# Patient Record
Sex: MALE | Race: WHITE | URBAN - METROPOLITAN AREA
[De-identification: names, ages, dates, MRNs, and addresses within clinical notes are randomized per-mention and may not be internally consistent; named-entity substitution may affect disease eponyms.]

---

## 2018-05-17 ENCOUNTER — HOSPITAL ENCOUNTER (OUTPATIENT)
Dept: MRI IMAGING | Facility: CLINIC | Age: 43
Discharge: HOME | End: 2018-05-17
Attending: PHYSICIAN ASSISTANT | Admitting: PHYSICIAN ASSISTANT
Payer: COMMERCIAL

## 2018-05-17 DIAGNOSIS — R20.0 NUMBNESS OF LEGS: ICD-10-CM

## 2018-05-17 DIAGNOSIS — M54.50 LOWER BACK PAIN: ICD-10-CM

## 2018-05-17 LAB
CREAT BLD-MCNC: 1 MG/DL (ref 0.66–1.25)
GFR SERPL CREATININE-BSD FRML MDRD: 81 ML/MIN/1.7M2

## 2018-05-17 PROCEDURE — A9585 GADOBUTROL INJECTION: HCPCS | Performed by: RADIOLOGY

## 2018-05-17 PROCEDURE — 82565 ASSAY OF CREATININE: CPT

## 2018-05-17 PROCEDURE — 25000128 H RX IP 250 OP 636: Performed by: RADIOLOGY

## 2018-05-17 PROCEDURE — 72158 MRI LUMBAR SPINE W/O & W/DYE: CPT

## 2018-05-17 RX ORDER — GADOBUTROL 604.72 MG/ML
7.5 INJECTION INTRAVENOUS ONCE
Status: COMPLETED | OUTPATIENT
Start: 2018-05-17 | End: 2018-05-17

## 2018-05-17 RX ADMIN — GADOBUTROL 7.5 ML: 604.72 INJECTION INTRAVENOUS at 19:18

## 2018-05-21 ENCOUNTER — OFFICE VISIT (OUTPATIENT)
Dept: ORTHOPEDICS | Facility: CLINIC | Age: 43
End: 2018-05-21
Payer: COMMERCIAL

## 2018-05-21 VITALS — WEIGHT: 177 LBS | SYSTOLIC BLOOD PRESSURE: 160 MMHG | BODY MASS INDEX: 28.57 KG/M2 | DIASTOLIC BLOOD PRESSURE: 102 MMHG

## 2018-05-21 DIAGNOSIS — M54.41 CHRONIC RIGHT-SIDED LOW BACK PAIN WITH RIGHT-SIDED SCIATICA: ICD-10-CM

## 2018-05-21 DIAGNOSIS — G89.29 CHRONIC RIGHT-SIDED LOW BACK PAIN WITH RIGHT-SIDED SCIATICA: ICD-10-CM

## 2018-05-21 DIAGNOSIS — M21.371 RIGHT FOOT DROP: ICD-10-CM

## 2018-05-21 DIAGNOSIS — M53.9 MULTILEVEL DEGENERATIVE DISC DISEASE: Primary | ICD-10-CM

## 2018-05-21 PROCEDURE — 99203 OFFICE O/P NEW LOW 30 MIN: CPT | Performed by: PHYSICAL MEDICINE & REHABILITATION

## 2018-05-21 RX ORDER — METHYLPREDNISOLONE 4 MG
8 TABLET, DOSE PACK ORAL SEE ADMIN INSTRUCTIONS
COMMUNITY

## 2018-05-21 NOTE — PATIENT INSTRUCTIONS
Today's Plan of Care:  -Referral to a Spine Surgeon  -Epidural steroid injection ordered  -Ice or heat 15-20 minutes as needed (Avoid sleeping on a heating pad or ice)  -Patient's preferred over the counter medications as directed on packaging as needed for pain or soreness.  Please take ibuprofen with food. Do not premedicate prior to activity.  -Patient to follow up with Primary Care provider regarding elevated blood pressure.    Follow Up:  As needed. Please call with any questions or concerns.

## 2018-05-21 NOTE — LETTER
May 21, 2018      Clif Martinez  [id number] [address] [place] [address]        To Whom It May Concern:    Clif Martinez  was seen on 5/21/2018 for back pain.  He may return to work without restrictions. He may work as tolerated. Thank you for your help in advance.         Sincerely,        Denisha Momin MD, CAQ

## 2018-05-21 NOTE — MR AVS SNAPSHOT
After Visit Summary   5/21/2018    Clif Martinez    MRN: [id number]           Patient Information     Date Of Birth          1975        Visit Information        Provider Department      5/21/2018 12:00 PM Denisha Momin MD [facility]        Today's Diagnoses     Multilevel degenerative disc disease    -  1    Chronic right-sided low back pain with right-sided sciatica        Right foot drop          Care Instructions    Today's Plan of Care:  -Referral to a Spine Surgeon  -Epidural steroid injection ordered  -Ice or heat 15-20 minutes as needed (Avoid sleeping on a heating pad or ice)  -Patient's preferred over the counter medications as directed on packaging as needed for pain or soreness.  Please take ibuprofen with food. Do not premedicate prior to activity.  -Patient to follow up with Primary Care provider regarding elevated blood pressure.    Follow Up:  As needed. Please call with any questions or concerns.                   Follow-ups after your visit        Additional Services     ORTHO  REFERRAL       [facility] is referring you to the Orthopedic  Services at [facility].       The  Representative will assist you in the coordination of your Orthopedic and Musculoskeletal Care as prescribed by your physician.    The  Representative will call you within 24 hours to help schedule your appointment, or you may contact the  Representative at:    [facility] ~ [phone]  [facility] ~ [phone]  [facility] ~ [phone]    Type of Referral : Spine: Lumbar  **Choose Medical Spine Specialist (unless patient was seen by a Medical Spine Specialist within the past 6 months).**  Surgical Evaluation is advised if the patient presents with one or more of the following red flags: Evidence of Spinal Tumor, Infection or Fracture, Cauda Equina  "Syndrome, Sudden or Progressive Weakness, Loss of Bowel or Bladder Control, or any other documented emergent neurological condition resulting from a Lumbar Spinal Condition. Spine Surgeon        Timeframe requested: 1 - 2 days     Coverage of these services is subject to the terms and limitations of your health insurance plan.  Please call member services at your health plan with any benefit or coverage questions.      If X-rays, CT or MRI's have been performed, please contact the facility where they were done to arrange for , prior to your scheduled appointment.  Please bring this referral request to your appointment and present it to your specialist.                  Future tests that were ordered for you today     Open Future Orders        Priority Expected Expires Ordered    XR Lumbar Transforaminal Inj Single Routine 5/21/2018 5/21/2019 5/21/2018            Who to contact     If you have questions or need follow up information about today's clinic visit or your schedule please contact [facility] directly at [phone].  Normal or non-critical lab and imaging results will be communicated to you by MyChart, letter or phone within 4 business days after the clinic has received the results. If you do not hear from us within 7 days, please contact the clinic through [facility] or phone. If you have a critical or abnormal lab result, we will notify you by phone as soon as possible.  Submit refill requests through [employer] or call your pharmacy and they will forward the refill request to us. Please allow 3 business days for your refill to be completed.          Additional Information About Your Visit        [employer] Information     [employer] lets you send messages to your doctor, view your test results, renew your prescriptions, schedule appointments and more. To sign up, go to [email or web address] . Click on \"Log in\" on the left side of the screen, which will take you to the Welcome page. Then " "click on \"Sign up Now\" on the right side of the page.     You will be asked to enter the access code listed below, as well as some personal information. Please follow the directions to create your username and password.     Your access code is: [email or web address]  Expires: 2018 12:35 PM     Your access code will  in 90 days. If you need help or a new code, please call your [place] clinic or [phone].        Care EveryWhere ID     This is your Care EveryWhere ID. This could be used by other organizations to access your [place] medical records  [id number]        Your Vitals Were     BMI (Body Mass Index)                   28.57 kg/m2            Blood Pressure from Last 3 Encounters:   18 (!) 160/102   16 (!) 132/97   16 109/78    Weight from Last 3 Encounters:   18 177 lb (80.3 kg)   16 171 lb (77.6 kg)   16 165 lb (74.8 kg)              We Performed the Following     ORTHO  REFERRAL        Primary Care Provider Office Phone # Fax #    Peter MOREJON [facility] [phone]       [address] [address]        Equal Access to Services     SHANAE DAY : Hadii alexsander ku hadasho Soomaali, waaxda luqadaha, qaybta kaalmada adeegyada, eric montiel haysuellen conn . So [facility] [phone].    ATENCIÓN: Si habla español, tiene a verdugo disposición servicios gratuitos de asistencia lingüística. Llame al [phone].    We comply with applicable federal civil rights laws and [place] laws. We do not discriminate on the basis of race, color, national origin, age, disability, sex, sexual orientation, or gender identity.            Thank you!     Thank you for choosing [facility]  for your care. Our goal is always to provide you with excellent care. Hearing back from our patients is one way we can continue to improve our services. Please take a few minutes to complete the written survey that you may receive in the " mail after your visit with us. Thank you!             Your Updated Medication List - Protect others around you: Learn how to safely use, store and throw away your medicines at www.disposemymeds.org.          This list is accurate as of 5/21/18 12:37 PM.  Always use your most recent med list.                   Brand Name Dispense Instructions for use Diagnosis    AMARYL PO      Take 1 mg by mouth daily (with breakfast)        GABAPENTIN PO      Take 300 mg by mouth 3 times daily        IBUPROFEN PO      Take 800 mg by mouth every 4 hours as needed for moderate pain        levothyroxine 25 mcg/mL Susp    SYNTHROID     Take 75 mg by mouth once        LIPITOR PO      Take 20 mg by mouth daily        METFORMIN HCL PO      Take 1,000 mg by mouth 2 times daily (with meals)        methylPREDNISolone 4 MG tablet    MEDROL DOSEPAK     Take 8 mg by mouth See Admin Instructions follow package directions        OMEPRAZOLE PO      Take 40 mg by mouth daily

## 2018-05-21 NOTE — PROGRESS NOTES
Sports Medicine Clinic Visit    PCP: Peter Kramer    CC: Patient presents with:  Back Pain      HPI:  Clif Martinez is a 43 year old male who is seen as a self referral.  He has chronic back pain, but notes his recent episode began two weeks ago.  The back pain is low on the right side.  He has history of 4 back surgeries, last one being in June of 2015. He was on a trip to [place] and when he returned 2 weeks ago he began to have numbness in the leg and a foot drop.   He rates the pain at a 10/10 at its worst and a 10/10 currently.  Symptoms are relieved with sitting. Symptoms are worsened by walking, lying down, and movement. He endorses numbness, tingling and weakness.   He denies swelling, bruising, popping, grinding, catching, locking, instability, pain in other joints and fever, chills.  Other treatment has included gapabentin, Medrol Dose pack, heat, ice, stretching. He has had previous surgeries, ESIs (without relief before previous surgeries) and physical therapy.  His primary referred to neurosurgery but he needs to see a spine specialist for his insurance first.        Review of Systems:  Musculoskeletal: as above  Remainder of review of systems is negative including constitutional, eyes, ENT, CV, pulmonary, GI, , endocrine, skin, hematologic, and neurologic except as noted in HPI or medical history.    History reviewed. No pertinent past surgical/medical/family/social history other than as mentioned in HPI.    Patient Active Problem List   Diagnosis   (none) - all problems resolved or deleted     Past Surgical History:   Procedure Laterality Date     BACK SURGERY       WRIST SURGERY       History reviewed. No pertinent family history.  Social History     Social History     Marital status:      Spouse name: N/A     Number of children: N/A     Years of education: N/A     Occupational History     Not on file.     Social History Main Topics     Smoking status: Former Smoker     Smokeless  tobacco: Current User     Alcohol use Yes      Comment: case of beer on the weekends     Drug use: No     Sexual activity: Yes     Other Topics Concern     Not on file     Social History Narrative       He works as a .    Current Outpatient Prescriptions   Medication     Atorvastatin Calcium (LIPITOR PO)     GABAPENTIN PO     Glimepiride (AMARYL PO)     levothyroxine (SYNTHROID) 25 mcg/mL     METFORMIN HCL PO     methylPREDNISolone (MEDROL DOSEPAK) 4 MG tablet     OMEPRAZOLE PO     IBUPROFEN PO     No current facility-administered medications for this visit.      Allergies   Allergen Reactions     Celebrex [Celecoxib] Rash         Objective:  BP (!) 160/102  Wt 177 lb (80.3 kg)  BMI 28.57 kg/m2    General: Alert and in no distress    Head: Normocephalic, atraumatic  Eyes: no scleral icterus or conjunctival erythema   Oropharynx:  Mucous membranes moist  Skin: no erythema, petechiae, or jaundice  CV: regular rhythm by palpation, 2+ distal pulses  Resp: normal respiratory effort without conversational dyspnea   Psych: normal mood and affect    Gait: Non-antalgic, appropriate coordination and balance   Neuro: Motor strength and sensation as noted below    Musculoskeletal:    Low back exam:    Inspection:       Healed lumbar surgical scars    Palpation:  No tenderness to palpation of the lumbosacral spine and paraspinal muscles    ROM: Full lumbar flexion, extension, rotation, and lateral flexion.  Forward flexion, extension, and right lateral flexion are painful.    Strength:  Left:  5/5 hip flexion/abduction/adduction, 5/5 knee flexion/extension, 5/5 ankle dorsiflexion/plantarflexion, 5/5 great toe extension, 5/5 toe flexion  Right: 5/5 hip flexion/abduction/adduction, 5/5 knee flexion/extension, 1/5 ankle dorsiflexion, 5-/5 ankle plantarflexion, 1/5 great toe extension, 5/5 toe flexion    Sensation:  Altered sensation to light touch distal to the right knee    Special tests: Right straight leg raise causes  right low back pain but not leg pain    Radiology:  Independent visualization of images performed and reviewed with Clif and his wife.    Recent Results (from the past 744 hour(s))   MR Lumbar Spine w/o & w Contrast    Narrative    MR LUMBAR SPINE WITHOUT AND WITH CONTRAST 5/17/2018 7:34 PM    HISTORY:  Right leg numbness. Lower back pain. Numbness of legs.    TECHNIQUE: Sagittal and transverse T1 and T2, sagittal inversion  recovery, and post gadolinium enhanced sagittal and transverse  T1-weighted pulse sequences.    DOSE:  7.5 mL Gadavist.    FINDINGS: Five lumbar vertebrae are assumed. There is multilevel  degenerative disc disease with complete loss of disc signal at T12-L1,  L3-4, and L4-5. There is also moderate-to-severe loss of disc height  at T12-L1 and L3-4. Mild apophyseal joint degenerative arthrosis is  noted, greatest at L5-S1 without periarticular marrow edema or  degenerative spondylolisthesis. Vertebral body heights are normal. The  conus medullaris is unremarkable in appearance on the sagittal images.       T12-L1: Small central disc protrusion slightly to the left of midline.  No central stenosis or effacement of the tip of the conus medullaris.  The neural foramina are bilaterally patent.    L1-2, L2-3: Normal.    L3-4: There has been partial laminectomy. However, there is diffuse  annular bulge and a right lateral recess with a large caudally  dissecting disc extrusion or herniation. This measures approximately  0.9 cm in AP diameter and extends caudally 1.9 cm from the disc level  beneath the right L4 pedicle. Rim enhancement is noted about the disc  fragment. At the disc level, there is overall moderate central  stenosis. Moderate-to-severe bilateral foraminal stenosis is also  noted.    L4-5: Posterior decompression without central stenosis. Within the  right lateral recess, there is a low-signal intensity 0.5 cm disc  fragment. Moderate bilateral foraminal stenosis.    L5-S1: Normal.       Impression    IMPRESSION:  1. Moderate-to-severe degenerative disc disease at T12-L1, L3-4, and  to lesser degree at L4-5.  2. L3-4 large 0.9 x 1.9 cm right lateral recess caudally dissecting  disc extrusion likely compressing the descending right L4 nerve root.  L3-4 annular bulge and osteophytic spurring results in moderate  central stenosis at the cephalad margin of the disc level.  3. L4-5 low-signal 0.5 cm right lateral recess disc fragment which may  compress the descending right L5 nerve root within the right lateral  recess. There has been a previous posterior decompression with no  evidence of L4-5 central stenosis.  4. Bilateral L3 and L4 foraminal stenosis.    AARON ROTHMAN MD         Assessment:  1. Multilevel degenerative disc disease    2. Chronic right-sided low back pain with right-sided sciatica    3. Right foot drop        Plan:  Discussed the assessment with the patient and developed a plan together:  -Reviewed clinical presentation and MRI results/images with Clif and his wife.  His weakness with ankle dorsiflexion and great toe extension are consistent with the right L4 and L5 nerve roots being affected.  I have placed a referral to spine surgery as well as one for an epidural steroid injection.    -Ice or heat 15-20 minutes as needed (Avoid sleeping on a heating pad or ice)  -Patient's preferred over the counter medications as directed on packaging as needed for pain or soreness.  Please take ibuprofen with food. Do not premedicate prior to activity.  -Work as tolerated.  Letter provided.    -Patient to follow up with Primary Care provider regarding elevated blood pressure.    -Follow up with me as needed.  Please call with questions or concerns.      Monika Momin MD, CAQ  [facility]

## 2018-05-21 NOTE — LETTER
5/21/2018         RE: Clif Martinez  [address] [address] [place] [address]        Dear Colleague,    Thank you for referring your patient, Clif Martinez, to the [facility]. Please see a copy of my visit note below.    Sports Medicine Clinic Visit    PCP: Peter Kramer    CC: Patient presents with:  Back Pain      HPI:  Clif Martinez is a 43 year old male who is seen as a self referral.  He has chronic back pain, but notes his recent episode back two weeks ago.  The back pain is low on the right side.  He has history of 4 back surgeries, last one being in June of 2015. He was on a trip to [place] and when he returned 2 weeks ago he began to have numbness in the leg and a foot drop.   He rates the pain at a 10/10 at its worst and a 10/10 currently.  Symptoms are relieved with sitting. Symptoms are worsened by walking, lying down, and movement. He endorses numbness, tingling and weakness.   He denies swelling, bruising, popping, grinding, catching, locking, instability, pain in other joints and fever, chills.  Other treatment has included gapabentin, Medrol Dose pack, heat, ice, stretching. He has had previous surgeries, ESIs (without relief before previous surgeries) and physical therapy.  His primary referred to neurosurgery but he needs to see a spine specialist for his insurance first.        Review of Systems:  Musculoskeletal: as above  Remainder of review of systems is negative including constitutional, eyes, ENT, CV, pulmonary, GI, , endocrine, skin, hematologic, and neurologic except as noted in HPI or medical history.    History reviewed. No pertinent past surgical/medical/family/social history other than as mentioned in HPI.    Patient Active Problem List   Diagnosis   (none) - all problems resolved or deleted     Past Surgical History:   Procedure Laterality Date     BACK SURGERY       WRIST SURGERY       History reviewed. No pertinent family history.  Social History     Social  History     Marital status:      Spouse name: N/A     Number of children: N/A     Years of education: N/A     Occupational History     Not on file.     Social History Main Topics     Smoking status: Former Smoker     Smokeless tobacco: Current User     Alcohol use Yes      Comment: case of beer on the weekends     Drug use: No     Sexual activity: Yes     Other Topics Concern     Not on file     Social History Narrative       He works as a .    Current Outpatient Prescriptions   Medication     Atorvastatin Calcium (LIPITOR PO)     GABAPENTIN PO     Glimepiride (AMARYL PO)     levothyroxine (SYNTHROID) 25 mcg/mL     METFORMIN HCL PO     methylPREDNISolone (MEDROL DOSEPAK) 4 MG tablet     OMEPRAZOLE PO     IBUPROFEN PO     No current facility-administered medications for this visit.      Allergies   Allergen Reactions     Celebrex [Celecoxib] Rash         Objective:  BP (!) 160/102  Wt 177 lb (80.3 kg)  BMI 28.57 kg/m2    General: Alert and in no distress    Head: Normocephalic, atraumatic  Eyes: no scleral icterus or conjunctival erythema   Oropharynx:  Mucous membranes moist  Skin: no erythema, petechiae, or jaundice  CV: regular rhythm by palpation, 2+ distal pulses  Resp: normal respiratory effort without conversational dyspnea   Psych: normal mood and affect    Gait: Non-antalgic, appropriate coordination and balance   Neuro: Motor strength and sensation as noted below    Musculoskeletal:    Low back exam:    Inspection:       Healed lumbar surgical scars    Palpation:  No tenderness to palpation of the lumbosacral spine and paraspinal muscles    ROM: Full lumbar flexion, extension, rotation, and lateral flexion.  Forward flexion, extension, and right lateral flexion are painful.    Strength:  Left:  5/5 hip flexion/abduction/adduction, 5/5 knee flexion/extension, 5/5 ankle dorsiflexion/plantarflexion, 5/5 great toe extension, 5/5 toe flexion  Right: 5/5 hip flexion/abduction/adduction, 5/5  knee flexion/extension, 1/5 ankle dorsiflexion, 5-/5 ankle plantarflexion, 1/5 great toe extension, 5/5 toe flexion    Sensation:  Altered sensation to light touch distal to the right knee    Special tests: Right straight leg raise causes right low back pain but not leg pain    Radiology:  Independent visualization of images performed and reviewed with Clif and his wife.    Recent Results (from the past 744 hour(s))   MR Lumbar Spine w/o & w Contrast    Narrative    MR LUMBAR SPINE WITHOUT AND WITH CONTRAST 5/17/2018 7:34 PM    HISTORY:  Right leg numbness. Lower back pain. Numbness of legs.    TECHNIQUE: Sagittal and transverse T1 and T2, sagittal inversion  recovery, and post gadolinium enhanced sagittal and transverse  T1-weighted pulse sequences.    DOSE:  7.5 mL Gadavist.    FINDINGS: Five lumbar vertebrae are assumed. There is multilevel  degenerative disc disease with complete loss of disc signal at T12-L1,  L3-4, and L4-5. There is also moderate-to-severe loss of disc height  at T12-L1 and L3-4. Mild apophyseal joint degenerative arthrosis is  noted, greatest at L5-S1 without periarticular marrow edema or  degenerative spondylolisthesis. Vertebral body heights are normal. The  conus medullaris is unremarkable in appearance on the sagittal images.       T12-L1: Small central disc protrusion slightly to the left of midline.  No central stenosis or effacement of the tip of the conus medullaris.  The neural foramina are bilaterally patent.    L1-2, L2-3: Normal.    L3-4: There has been partial laminectomy. However, there is diffuse  annular bulge and a right lateral recess with a large caudally  dissecting disc extrusion or herniation. This measures approximately  0.9 cm in AP diameter and extends caudally 1.9 cm from the disc level  beneath the right L4 pedicle. Rim enhancement is noted about the disc  fragment. At the disc level, there is overall moderate central  stenosis. Moderate-to-severe bilateral  foraminal stenosis is also  noted.    L4-5: Posterior decompression without central stenosis. Within the  right lateral recess, there is a low-signal intensity 0.5 cm disc  fragment. Moderate bilateral foraminal stenosis.    L5-S1: Normal.      Impression    IMPRESSION:  1. Moderate-to-severe degenerative disc disease at T12-L1, L3-4, and  to lesser degree at L4-5.  2. L3-4 large 0.9 x 1.9 cm right lateral recess caudally dissecting  disc extrusion likely compressing the descending right L4 nerve root.  L3-4 annular bulge and osteophytic spurring results in moderate  central stenosis at the cephalad margin of the disc level.  3. L4-5 low-signal 0.5 cm right lateral recess disc fragment which may  compress the descending right L5 nerve root within the right lateral  recess. There has been a previous posterior decompression with no  evidence of L4-5 central stenosis.  4. Bilateral L3 and L4 foraminal stenosis.    AARON ROTHMAN MD         Assessment:  1. Multilevel degenerative disc disease    2. Chronic right-sided low back pain with right-sided sciatica    3. Right foot drop        Plan:  Discussed the assessment with the patient and developed a plan together:  -Reviewed clinical presentation and MRI results/images with Clif and his wife.  His weakness with ankle dorsiflexion and great toe extension are consistent with the right L4 and L5 nerve roots being affected.  I have placed a referral to spine surgery as well as one for an epidural steroid injection.    -Ice or heat 15-20 minutes as needed (Avoid sleeping on a heating pad or ice)  -Patient's preferred over the counter medications as directed on packaging as needed for pain or soreness.  Please take ibuprofen with food. Do not premedicate prior to activity.  -Work as tolerated.  Letter provided.    -Patient to follow up with Primary Care provider regarding elevated blood pressure.    Monika Momin MD, CAQ  [facility]      Again,  thank you for allowing me to participate in the care of your patient.        Sincerely,        Denisha Momin MD

## 2018-06-22 ENCOUNTER — HOSPITAL ENCOUNTER (OUTPATIENT)
Dept: CT IMAGING | Facility: CLINIC | Age: 43
Discharge: HOME | End: 2018-06-22
Attending: PHYSICIAN ASSISTANT | Admitting: PHYSICIAN ASSISTANT
Payer: COMMERCIAL

## 2018-06-22 DIAGNOSIS — R10.31 RLQ ABDOMINAL PAIN: ICD-10-CM

## 2018-06-22 DIAGNOSIS — Z98.890 STATUS POST SURGERY: ICD-10-CM

## 2018-06-22 LAB
CREAT BLD-MCNC: 0.8 MG/DL (ref 0.66–1.25)
GFR SERPL CREATININE-BSD FRML MDRD: >90 ML/MIN/1.7M2

## 2018-06-22 PROCEDURE — 25000128 H RX IP 250 OP 636: Performed by: RADIOLOGY

## 2018-06-22 PROCEDURE — 82565 ASSAY OF CREATININE: CPT

## 2018-06-22 PROCEDURE — 74177 CT ABD & PELVIS W/CONTRAST: CPT

## 2018-06-22 PROCEDURE — 25000125 ZZHC RX 250: Performed by: RADIOLOGY

## 2018-06-22 RX ORDER — IOPAMIDOL 755 MG/ML
500 INJECTION, SOLUTION INTRAVASCULAR ONCE
Status: COMPLETED | OUTPATIENT
Start: 2018-06-22 | End: 2018-06-22

## 2018-06-22 RX ADMIN — IOPAMIDOL 85 ML: 755 INJECTION, SOLUTION INTRAVENOUS at 08:14

## 2018-06-22 RX ADMIN — SODIUM CHLORIDE 60 ML: 9 INJECTION, SOLUTION INTRAVENOUS at 08:14

## 2018-07-01 ENCOUNTER — HOME INFUSION (PRE-WILLOW HOME INFUSION) (OUTPATIENT)
Dept: PHARMACY | Facility: CLINIC | Age: 43
End: 2018-07-01

## 2018-07-02 ENCOUNTER — HOME INFUSION (PRE-WILLOW HOME INFUSION) (OUTPATIENT)
Dept: PHARMACY | Facility: CLINIC | Age: 43
End: 2018-07-02

## 2018-07-02 NOTE — PROGRESS NOTES
This is a recent snapshot of the patient's [facility] medical record.  For current drug dose and complete information and questions, call [phone] or In [facility] pool, fv home infusion ([id number])  CSN Number:  [id number]

## 2018-07-03 ENCOUNTER — HOME INFUSION (PRE-WILLOW HOME INFUSION) (OUTPATIENT)
Dept: PHARMACY | Facility: CLINIC | Age: 43
End: 2018-07-03

## 2018-07-03 DIAGNOSIS — K65.1 POSTOPERATIVE INTRA-ABDOMINAL ABSCESS (H): Primary | ICD-10-CM

## 2018-07-03 DIAGNOSIS — T81.43XA POSTOPERATIVE INTRA-ABDOMINAL ABSCESS (H): Primary | ICD-10-CM

## 2018-07-03 LAB
BUN SERPL-MCNC: 12 MG/DL (ref 7–30)
CREAT SERPL-MCNC: 0.63 MG/DL (ref 0.66–1.25)
CRP SERPL-MCNC: 27.9 MG/L (ref 0–8)
ERYTHROCYTE [DISTWIDTH] IN BLOOD BY AUTOMATED COUNT: 13.9 % (ref 10–15)
GFR SERPL CREATININE-BSD FRML MDRD: >90 ML/MIN/1.7M2
HCT VFR BLD AUTO: 37.3 % (ref 40–53)
HGB BLD-MCNC: 12.1 G/DL (ref 13.3–17.7)
MCH RBC QN AUTO: 27.9 PG (ref 26.5–33)
MCHC RBC AUTO-ENTMCNC: 32.4 G/DL (ref 31.5–36.5)
MCV RBC AUTO: 86 FL (ref 78–100)
PLATELET # BLD AUTO: 496 10E9/L (ref 150–450)
RBC # BLD AUTO: 4.33 10E12/L (ref 4.4–5.9)
VANCOMYCIN SERPL-MCNC: 17.1 MG/L
WBC # BLD AUTO: 9 10E9/L (ref 4–11)

## 2018-07-03 PROCEDURE — 80202 ASSAY OF VANCOMYCIN: CPT | Performed by: INTERNAL MEDICINE

## 2018-07-03 PROCEDURE — 85027 COMPLETE CBC AUTOMATED: CPT | Performed by: INTERNAL MEDICINE

## 2018-07-03 PROCEDURE — 82565 ASSAY OF CREATININE: CPT | Performed by: INTERNAL MEDICINE

## 2018-07-03 PROCEDURE — 84520 ASSAY OF UREA NITROGEN: CPT | Performed by: INTERNAL MEDICINE

## 2018-07-03 PROCEDURE — 36415 COLL VENOUS BLD VENIPUNCTURE: CPT | Performed by: INTERNAL MEDICINE

## 2018-07-03 PROCEDURE — 86140 C-REACTIVE PROTEIN: CPT | Performed by: INTERNAL MEDICINE

## 2018-07-03 NOTE — PROGRESS NOTES
This is a recent snapshot of the patient's [facility] medical record.  For current drug dose and complete information and questions, call [phone] or In [facility] pool, fv home infusion ([id number])  CSN Number:  [id number]

## 2018-07-11 DIAGNOSIS — T81.43XA POSTOPERATIVE INTRA-ABDOMINAL ABSCESS (H): ICD-10-CM

## 2018-07-11 DIAGNOSIS — K65.1 POSTOPERATIVE INTRA-ABDOMINAL ABSCESS (H): ICD-10-CM

## 2018-07-11 LAB
BUN SERPL-MCNC: 9 MG/DL (ref 7–30)
CREAT SERPL-MCNC: 0.68 MG/DL (ref 0.66–1.25)
CRP SERPL-MCNC: 10.6 MG/L (ref 0–8)
ERYTHROCYTE [DISTWIDTH] IN BLOOD BY AUTOMATED COUNT: 14.6 % (ref 10–15)
GFR SERPL CREATININE-BSD FRML MDRD: >90 ML/MIN/1.7M2
HCT VFR BLD AUTO: 40.8 % (ref 40–53)
HGB BLD-MCNC: 13.2 G/DL (ref 13.3–17.7)
MCH RBC QN AUTO: 28 PG (ref 26.5–33)
MCHC RBC AUTO-ENTMCNC: 32.4 G/DL (ref 31.5–36.5)
MCV RBC AUTO: 86 FL (ref 78–100)
PLATELET # BLD AUTO: 439 10E9/L (ref 150–450)
RBC # BLD AUTO: 4.72 10E12/L (ref 4.4–5.9)
VANCOMYCIN SERPL-MCNC: 18.8 MG/L
WBC # BLD AUTO: 10.2 10E9/L (ref 4–11)

## 2018-07-11 PROCEDURE — 82565 ASSAY OF CREATININE: CPT | Performed by: INTERNAL MEDICINE

## 2018-07-11 PROCEDURE — 84520 ASSAY OF UREA NITROGEN: CPT | Performed by: INTERNAL MEDICINE

## 2018-07-11 PROCEDURE — 86140 C-REACTIVE PROTEIN: CPT | Performed by: INTERNAL MEDICINE

## 2018-07-11 PROCEDURE — 36415 COLL VENOUS BLD VENIPUNCTURE: CPT | Performed by: INTERNAL MEDICINE

## 2018-07-11 PROCEDURE — 80202 ASSAY OF VANCOMYCIN: CPT | Performed by: INTERNAL MEDICINE

## 2018-07-11 PROCEDURE — 85027 COMPLETE CBC AUTOMATED: CPT | Performed by: INTERNAL MEDICINE

## 2018-07-12 ENCOUNTER — HOME INFUSION (PRE-WILLOW HOME INFUSION) (OUTPATIENT)
Dept: PHARMACY | Facility: CLINIC | Age: 43
End: 2018-07-12

## 2018-07-12 NOTE — PROGRESS NOTES
This is a recent snapshot of the patient's [facility] medical record.  For current drug dose and complete information and questions, call [phone] or In Basket pool, fv home infusion ([id number])  CSN Number:  [id number]

## 2018-07-13 NOTE — PROGRESS NOTES
This is a recent snapshot of the patient's [facility] medical record.  For current drug dose and complete information and questions, call [phone] or In Basket pool, fv home infusion ([id number])  CSN Number:  [id number]

## 2018-07-18 ENCOUNTER — HOME INFUSION (PRE-WILLOW HOME INFUSION) (OUTPATIENT)
Dept: PHARMACY | Facility: CLINIC | Age: 43
End: 2018-07-18

## 2018-07-18 LAB
BASOPHILS # BLD AUTO: 0 10E9/L (ref 0–0.2)
BASOPHILS NFR BLD AUTO: 0.3 %
BUN SERPL-MCNC: 6 MG/DL (ref 7–30)
CREAT SERPL-MCNC: 0.6 MG/DL (ref 0.66–1.25)
CRP SERPL-MCNC: 5.7 MG/L (ref 0–8)
DIFFERENTIAL METHOD BLD: ABNORMAL
EOSINOPHIL # BLD AUTO: 0.5 10E9/L (ref 0–0.7)
EOSINOPHIL NFR BLD AUTO: 7.3 %
ERYTHROCYTE [DISTWIDTH] IN BLOOD BY AUTOMATED COUNT: 14.7 % (ref 10–15)
ERYTHROCYTE [SEDIMENTATION RATE] IN BLOOD BY WESTERGREN METHOD: 9 MM/H (ref 0–15)
GFR SERPL CREATININE-BSD FRML MDRD: >90 ML/MIN/1.7M2
HCT VFR BLD AUTO: 41.8 % (ref 40–53)
HGB BLD-MCNC: 13.2 G/DL (ref 13.3–17.7)
IMM GRANULOCYTES # BLD: 0 10E9/L (ref 0–0.4)
IMM GRANULOCYTES NFR BLD: 0.4 %
LYMPHOCYTES # BLD AUTO: 1.5 10E9/L (ref 0.8–5.3)
LYMPHOCYTES NFR BLD AUTO: 20.1 %
MCH RBC QN AUTO: 27.3 PG (ref 26.5–33)
MCHC RBC AUTO-ENTMCNC: 31.6 G/DL (ref 31.5–36.5)
MCV RBC AUTO: 87 FL (ref 78–100)
MONOCYTES # BLD AUTO: 0.8 10E9/L (ref 0–1.3)
MONOCYTES NFR BLD AUTO: 10.2 %
NEUTROPHILS # BLD AUTO: 4.6 10E9/L (ref 1.6–8.3)
NEUTROPHILS NFR BLD AUTO: 61.7 %
NRBC # BLD AUTO: 0 10*3/UL
NRBC BLD AUTO-RTO: 0 /100
PLATELET # BLD AUTO: 376 10E9/L (ref 150–450)
RBC # BLD AUTO: 4.83 10E12/L (ref 4.4–5.9)
VANCOMYCIN SERPL-MCNC: 17.2 MG/L
WBC # BLD AUTO: 7.4 10E9/L (ref 4–11)

## 2018-07-18 PROCEDURE — 80202 ASSAY OF VANCOMYCIN: CPT | Performed by: INTERNAL MEDICINE

## 2018-07-18 PROCEDURE — 84520 ASSAY OF UREA NITROGEN: CPT | Performed by: INTERNAL MEDICINE

## 2018-07-18 PROCEDURE — 85652 RBC SED RATE AUTOMATED: CPT | Performed by: INTERNAL MEDICINE

## 2018-07-18 PROCEDURE — 86140 C-REACTIVE PROTEIN: CPT | Performed by: INTERNAL MEDICINE

## 2018-07-18 PROCEDURE — 85025 COMPLETE CBC W/AUTO DIFF WBC: CPT | Performed by: INTERNAL MEDICINE

## 2018-07-18 PROCEDURE — 82565 ASSAY OF CREATININE: CPT | Performed by: INTERNAL MEDICINE

## 2018-07-19 ENCOUNTER — HOME INFUSION (PRE-WILLOW HOME INFUSION) (OUTPATIENT)
Dept: PHARMACY | Facility: CLINIC | Age: 43
End: 2018-07-19

## 2018-07-19 NOTE — PROGRESS NOTES
This is a recent snapshot of the patient's [facility] medical record.  For current drug dose and complete information and questions, call [phone] or In Basket pool, fv home infusion ([id number])  CSN Number:  [id number]

## 2018-07-20 ENCOUNTER — HOME INFUSION (PRE-WILLOW HOME INFUSION) (OUTPATIENT)
Dept: PHARMACY | Facility: CLINIC | Age: 43
End: 2018-07-20

## 2018-07-20 NOTE — PROGRESS NOTES
This is a recent snapshot of the patient's [facility] medical record.  For current drug dose and complete information and questions, call [phone] or In Basket pool, fv home infusion ([id number])  CSN Number:  [id number]

## 2018-07-23 NOTE — PROGRESS NOTES
This is a recent snapshot of the patient's [facility] medical record.  For current drug dose and complete information and questions, call [phone] or In Basket pool, fv home infusion ([id number])  CSN Number:  [id number]

## 2018-07-26 PROCEDURE — 84450 TRANSFERASE (AST) (SGOT): CPT | Performed by: INTERNAL MEDICINE

## 2018-07-26 PROCEDURE — 80202 ASSAY OF VANCOMYCIN: CPT | Performed by: INTERNAL MEDICINE

## 2018-07-26 PROCEDURE — 86140 C-REACTIVE PROTEIN: CPT | Performed by: INTERNAL MEDICINE

## 2018-07-26 PROCEDURE — 85025 COMPLETE CBC W/AUTO DIFF WBC: CPT | Performed by: INTERNAL MEDICINE

## 2018-07-26 PROCEDURE — 82565 ASSAY OF CREATININE: CPT | Performed by: INTERNAL MEDICINE

## 2018-07-26 PROCEDURE — 85652 RBC SED RATE AUTOMATED: CPT | Performed by: INTERNAL MEDICINE

## 2018-07-26 PROCEDURE — 84460 ALANINE AMINO (ALT) (SGPT): CPT | Performed by: INTERNAL MEDICINE

## 2018-07-27 ENCOUNTER — HOME INFUSION (PRE-WILLOW HOME INFUSION) (OUTPATIENT)
Dept: PHARMACY | Facility: CLINIC | Age: 43
End: 2018-07-27

## 2018-07-27 LAB
ALT SERPL W P-5'-P-CCNC: 22 U/L (ref 0–70)
AST SERPL W P-5'-P-CCNC: 13 U/L (ref 0–45)
BASOPHILS # BLD AUTO: 0 10E9/L (ref 0–0.2)
BASOPHILS NFR BLD AUTO: 0.2 %
CREAT SERPL-MCNC: 0.65 MG/DL (ref 0.66–1.25)
CRP SERPL-MCNC: 10.4 MG/L (ref 0–8)
DIFFERENTIAL METHOD BLD: NORMAL
EOSINOPHIL # BLD AUTO: 0.4 10E9/L (ref 0–0.7)
EOSINOPHIL NFR BLD AUTO: 8.2 %
ERYTHROCYTE [DISTWIDTH] IN BLOOD BY AUTOMATED COUNT: 14.7 % (ref 10–15)
ERYTHROCYTE [SEDIMENTATION RATE] IN BLOOD BY WESTERGREN METHOD: 8 MM/H (ref 0–15)
GFR SERPL CREATININE-BSD FRML MDRD: >90 ML/MIN/1.7M2
HCT VFR BLD AUTO: 42.6 % (ref 40–53)
HGB BLD-MCNC: 13.6 G/DL (ref 13.3–17.7)
IMM GRANULOCYTES # BLD: 0 10E9/L (ref 0–0.4)
IMM GRANULOCYTES NFR BLD: 0.2 %
LYMPHOCYTES # BLD AUTO: 1.5 10E9/L (ref 0.8–5.3)
LYMPHOCYTES NFR BLD AUTO: 34.3 %
MCH RBC QN AUTO: 27.5 PG (ref 26.5–33)
MCHC RBC AUTO-ENTMCNC: 31.9 G/DL (ref 31.5–36.5)
MCV RBC AUTO: 86 FL (ref 78–100)
MONOCYTES # BLD AUTO: 0.9 10E9/L (ref 0–1.3)
MONOCYTES NFR BLD AUTO: 19.3 %
NEUTROPHILS # BLD AUTO: 1.7 10E9/L (ref 1.6–8.3)
NEUTROPHILS NFR BLD AUTO: 37.8 %
NRBC # BLD AUTO: 0 10*3/UL
NRBC BLD AUTO-RTO: 0 /100
PLATELET # BLD AUTO: 294 10E9/L (ref 150–450)
RBC # BLD AUTO: 4.95 10E12/L (ref 4.4–5.9)
VANCOMYCIN SERPL-MCNC: 21.3 MG/L
WBC # BLD AUTO: 4.4 10E9/L (ref 4–11)

## 2018-07-30 NOTE — PROGRESS NOTES
This is a recent snapshot of the patient's [facility] medical record.  For current drug dose and complete information and questions, call [phone] or In Basket pool, fv home infusion ([id number])  CSN Number:  [id number]

## 2018-08-02 ENCOUNTER — HOME INFUSION (PRE-WILLOW HOME INFUSION) (OUTPATIENT)
Dept: PHARMACY | Facility: CLINIC | Age: 43
End: 2018-08-02

## 2018-08-06 NOTE — PROGRESS NOTES
This is a recent snapshot of the patient's [facility] medical record.  For current drug dose and complete information and questions, call [phone] or In Basket pool, fv home infusion ([id number])  CSN Number:  [id number]

## 2018-08-08 LAB
ALBUMIN SERPL-MCNC: 4.1 G/DL (ref 3.4–5)
ALP SERPL-CCNC: 91 U/L (ref 40–150)
ALT SERPL W P-5'-P-CCNC: 21 U/L (ref 0–70)
ANION GAP SERPL CALCULATED.3IONS-SCNC: 14 MMOL/L (ref 3–14)
AST SERPL W P-5'-P-CCNC: 16 U/L (ref 0–45)
BASOPHILS # BLD AUTO: 0.1 10E9/L (ref 0–0.2)
BASOPHILS NFR BLD AUTO: 0.7 %
BILIRUB SERPL-MCNC: 0.5 MG/DL (ref 0.2–1.3)
BUN SERPL-MCNC: 18 MG/DL (ref 7–30)
CALCIUM SERPL-MCNC: 9.3 MG/DL (ref 8.5–10.1)
CHLORIDE SERPL-SCNC: 105 MMOL/L (ref 94–109)
CO2 SERPL-SCNC: 23 MMOL/L (ref 20–32)
CREAT SERPL-MCNC: 0.68 MG/DL (ref 0.66–1.25)
CRP SERPL-MCNC: 5.8 MG/L (ref 0–8)
DIFFERENTIAL METHOD BLD: NORMAL
EOSINOPHIL # BLD AUTO: 0.3 10E9/L (ref 0–0.7)
EOSINOPHIL NFR BLD AUTO: 3.6 %
ERYTHROCYTE [DISTWIDTH] IN BLOOD BY AUTOMATED COUNT: 14.5 % (ref 10–15)
ERYTHROCYTE [SEDIMENTATION RATE] IN BLOOD BY WESTERGREN METHOD: 8 MM/H (ref 0–15)
GFR SERPL CREATININE-BSD FRML MDRD: >90 ML/MIN/1.7M2
GLUCOSE SERPL-MCNC: 101 MG/DL (ref 70–99)
HCT VFR BLD AUTO: 45.6 % (ref 40–53)
HGB BLD-MCNC: 14.7 G/DL (ref 13.3–17.7)
IMM GRANULOCYTES # BLD: 0.1 10E9/L (ref 0–0.4)
IMM GRANULOCYTES NFR BLD: 0.7 %
LYMPHOCYTES # BLD AUTO: 1.5 10E9/L (ref 0.8–5.3)
LYMPHOCYTES NFR BLD AUTO: 20 %
MCH RBC QN AUTO: 27.4 PG (ref 26.5–33)
MCHC RBC AUTO-ENTMCNC: 32.2 G/DL (ref 31.5–36.5)
MCV RBC AUTO: 85 FL (ref 78–100)
MONOCYTES # BLD AUTO: 0.7 10E9/L (ref 0–1.3)
MONOCYTES NFR BLD AUTO: 9.3 %
NEUTROPHILS # BLD AUTO: 4.9 10E9/L (ref 1.6–8.3)
NEUTROPHILS NFR BLD AUTO: 65.7 %
NRBC # BLD AUTO: 0 10*3/UL
NRBC BLD AUTO-RTO: 0 /100
PLATELET # BLD AUTO: 382 10E9/L (ref 150–450)
POTASSIUM SERPL-SCNC: 3.9 MMOL/L (ref 3.4–5.3)
PROT SERPL-MCNC: 7.8 G/DL (ref 6.8–8.8)
RBC # BLD AUTO: 5.36 10E12/L (ref 4.4–5.9)
SODIUM SERPL-SCNC: 142 MMOL/L (ref 133–144)
VANCOMYCIN SERPL-MCNC: 12.9 MG/L
WBC # BLD AUTO: 7.4 10E9/L (ref 4–11)

## 2018-08-08 PROCEDURE — 80053 COMPREHEN METABOLIC PANEL: CPT | Performed by: INTERNAL MEDICINE

## 2018-08-08 PROCEDURE — 85025 COMPLETE CBC W/AUTO DIFF WBC: CPT | Performed by: INTERNAL MEDICINE

## 2018-08-08 PROCEDURE — 86140 C-REACTIVE PROTEIN: CPT | Performed by: INTERNAL MEDICINE

## 2018-08-08 PROCEDURE — 85652 RBC SED RATE AUTOMATED: CPT | Performed by: INTERNAL MEDICINE

## 2018-08-08 PROCEDURE — 80202 ASSAY OF VANCOMYCIN: CPT | Performed by: INTERNAL MEDICINE

## 2018-08-09 ENCOUNTER — HOME INFUSION (PRE-WILLOW HOME INFUSION) (OUTPATIENT)
Dept: PHARMACY | Facility: CLINIC | Age: 43
End: 2018-08-09

## 2018-08-10 NOTE — PROGRESS NOTES
This is a recent snapshot of the patient's [facility] medical record.  For current drug dose and complete information and questions, call [phone] or In Basket pool, fv home infusion ([id number])  CSN Number:  [id number]

## 2018-08-14 ENCOUNTER — HOME INFUSION (PRE-WILLOW HOME INFUSION) (OUTPATIENT)
Dept: PHARMACY | Facility: CLINIC | Age: 43
End: 2018-08-14

## 2018-08-15 NOTE — PROGRESS NOTES
This is a recent snapshot of the patient's [facility] medical record.  For current drug dose and complete information and questions, call [phone] or In Basket pool, fv home infusion ([id number])  CSN Number:  [id number]

## 2018-08-23 ENCOUNTER — HOME INFUSION (PRE-WILLOW HOME INFUSION) (OUTPATIENT)
Dept: PHARMACY | Facility: CLINIC | Age: 43
End: 2018-08-23

## 2018-08-24 ENCOUNTER — HOME INFUSION (PRE-WILLOW HOME INFUSION) (OUTPATIENT)
Dept: PHARMACY | Facility: CLINIC | Age: 43
End: 2018-08-24

## 2018-08-27 NOTE — PROGRESS NOTES
This is a recent snapshot of the patient's [facility] medical record.  For current drug dose and complete information and questions, call [phone] or In Basket pool, fv home infusion ([id number])  CSN Number:  [id number]

## 2018-08-28 ENCOUNTER — HOME INFUSION (PRE-WILLOW HOME INFUSION) (OUTPATIENT)
Dept: PHARMACY | Facility: CLINIC | Age: 43
End: 2018-08-28

## 2018-09-05 NOTE — PROGRESS NOTES
This is a recent snapshot of the patient's [facility] medical record.  For current drug dose and complete information and questions, call [phone] or In Basket pool, fv home infusion ([id number])  CSN Number:  [id number]

## 2019-04-16 ENCOUNTER — HOSPITAL ENCOUNTER (OUTPATIENT)
Dept: MRI IMAGING | Facility: CLINIC | Age: 44
Discharge: HOME | End: 2019-04-16
Attending: PHYSICIAN ASSISTANT | Admitting: PHYSICIAN ASSISTANT
Payer: COMMERCIAL

## 2019-04-16 DIAGNOSIS — R20.0 HAND NUMBNESS: ICD-10-CM

## 2019-04-16 DIAGNOSIS — M54.2 CERVICAL PAIN: ICD-10-CM

## 2019-04-16 PROCEDURE — 72141 MRI NECK SPINE W/O DYE: CPT

## 2019-04-26 ENCOUNTER — MEDICAL CORRESPONDENCE (OUTPATIENT)
Dept: HEALTH INFORMATION MANAGEMENT | Facility: CLINIC | Age: 44
End: 2019-04-26

## 2019-05-15 ENCOUNTER — HOSPITAL ENCOUNTER (OUTPATIENT)
Dept: PHYSICAL THERAPY | Facility: CLINIC | Age: 44
Setting detail: THERAPIES SERIES
End: 2019-05-15
Attending: NEUROLOGICAL SURGERY
Payer: COMMERCIAL

## 2019-05-15 PROCEDURE — 97112 NEUROMUSCULAR REEDUCATION: CPT | Mod: GP | Performed by: PHYSICAL THERAPIST

## 2019-05-15 PROCEDURE — 97161 PT EVAL LOW COMPLEX 20 MIN: CPT | Mod: GP | Performed by: PHYSICAL THERAPIST

## 2019-05-15 PROCEDURE — 97140 MANUAL THERAPY 1/> REGIONS: CPT | Mod: GP | Performed by: PHYSICAL THERAPIST
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
## 2019-05-30 ENCOUNTER — HOSPITAL ENCOUNTER (OUTPATIENT)
Dept: PHYSICAL THERAPY | Facility: CLINIC | Age: 44
Setting detail: THERAPIES SERIES
End: 2019-05-30
Attending: NEUROLOGICAL SURGERY
Payer: COMMERCIAL

## 2019-05-30 PROCEDURE — 97012 MECHANICAL TRACTION THERAPY: CPT | Mod: GP | Performed by: PHYSICAL THERAPIST

## 2019-05-30 PROCEDURE — 97140 MANUAL THERAPY 1/> REGIONS: CPT | Mod: GP,59 | Performed by: PHYSICAL THERAPIST

## 2019-06-04 ENCOUNTER — HOSPITAL ENCOUNTER (OUTPATIENT)
Dept: PHYSICAL THERAPY | Facility: CLINIC | Age: 44
Setting detail: THERAPIES SERIES
End: 2019-06-04
Attending: NEUROLOGICAL SURGERY
Payer: COMMERCIAL

## 2019-06-04 PROCEDURE — 97110 THERAPEUTIC EXERCISES: CPT | Mod: GP | Performed by: PHYSICAL THERAPIST

## 2019-06-04 PROCEDURE — 97012 MECHANICAL TRACTION THERAPY: CPT | Mod: GP | Performed by: PHYSICAL THERAPIST

## 2019-06-04 PROCEDURE — 97140 MANUAL THERAPY 1/> REGIONS: CPT | Mod: GP | Performed by: PHYSICAL THERAPIST

## 2019-06-11 ENCOUNTER — HOSPITAL ENCOUNTER (OUTPATIENT)
Dept: PHYSICAL THERAPY | Facility: CLINIC | Age: 44
Setting detail: THERAPIES SERIES
End: 2019-06-11
Attending: NEUROLOGICAL SURGERY
Payer: COMMERCIAL

## 2019-06-11 PROCEDURE — 97110 THERAPEUTIC EXERCISES: CPT | Mod: GP

## 2019-06-11 PROCEDURE — 97140 MANUAL THERAPY 1/> REGIONS: CPT | Mod: GP

## 2019-09-03 NOTE — DISCHARGE SUMMARY
Outpatient Physical Therapy Discharge Note     Patient: Clif Martinez  : 1975    Beginning/End Dates of Reporting Period:  4 sessions between 5-15-19 and 19    Referring Provider: Dr. Abdirahman Bauer MD    Therapy Diagnosis: Radial nerve positive test L and brachial plexus tightness, extension responder.      Client Self Report: At the time of his last session, he reported:  Patient reports he has been very sore, tingling into his hands over the weekend. HEP is going well, but feels more sore afterwards.     Objective Measurements:  At the time of his last session:   Objective Measure: Symptoms  Details: 2/10 into fingers and thumb (L>R) and L upper trap into shoulder.   Objective Measure: Cervical AROM seated  Details: 90% L rotation, 100% R rotation, full flexion and 40% extension  Objective Measure: MRROM  Details: 5/5 shoulder flexion, extension L ER and IR bilaterally, biceps, triceps and wrist including UD, RD flexion and extension. R shoulder ER is 3/+/5 compared to the L.      Goals:  Goal Identifier Pain level    Goal Description Kev  will be able to decrease his neck symptoms x 50% to allow for better mobility   Target Date     Date Met      Progress:   Progress Toward Goals:   Not assessed this period.    Plan:  Discharge from therapy.    Discharge:    Reason for Discharge: Patient has failed to schedule further appointments.    Equipment Issued: none    Discharge Plan: Patient to continue home program.    Thank you for referring Clif  to [facility] - Randi Sanchez, PT  [phone]

## 2019-09-03 NOTE — ADDENDUM NOTE
Encounter addended by: Maryam Sanchez PT on: 9/3/2019 7:55 AM   Actions taken: Sign clinical note, Episode resolved

## 2021-04-22 ENCOUNTER — HOSPITAL ENCOUNTER (OUTPATIENT)
Dept: MRI IMAGING | Facility: CLINIC | Age: 46
End: 2021-04-22
Attending: PHYSICIAN ASSISTANT
Payer: COMMERCIAL

## 2021-04-22 ENCOUNTER — HOSPITAL ENCOUNTER (OUTPATIENT)
Dept: MRI IMAGING | Facility: CLINIC | Age: 46
End: 2021-04-22
Attending: NEUROLOGICAL SURGERY
Payer: COMMERCIAL

## 2021-04-22 DIAGNOSIS — M48.02 CERVICAL STENOSIS OF SPINE: ICD-10-CM

## 2021-04-22 DIAGNOSIS — M91.12 LEGG-PERTHES DISEASE, LEFT: ICD-10-CM

## 2021-04-22 DIAGNOSIS — M25.552 LEFT HIP PAIN: ICD-10-CM

## 2021-04-22 LAB — RADIOLOGIST FLAGS: NORMAL

## 2021-04-22 PROCEDURE — 73721 MRI JNT OF LWR EXTRE W/O DYE: CPT | Mod: 26 | Performed by: RADIOLOGY

## 2021-04-22 PROCEDURE — 72141 MRI NECK SPINE W/O DYE: CPT

## 2021-04-22 PROCEDURE — 73721 MRI JNT OF LWR EXTRE W/O DYE: CPT | Mod: LT
